# Patient Record
Sex: MALE | Race: WHITE | NOT HISPANIC OR LATINO | Employment: FULL TIME | URBAN - METROPOLITAN AREA
[De-identification: names, ages, dates, MRNs, and addresses within clinical notes are randomized per-mention and may not be internally consistent; named-entity substitution may affect disease eponyms.]

---

## 2020-06-22 ENCOUNTER — OFFICE VISIT (OUTPATIENT)
Dept: ORTHOPEDICS | Facility: CLINIC | Age: 35
End: 2020-06-22
Payer: COMMERCIAL

## 2020-06-22 VITALS — BODY MASS INDEX: 34.35 KG/M2 | WEIGHT: 226.69 LBS | HEIGHT: 68 IN | TEMPERATURE: 100 F

## 2020-06-22 DIAGNOSIS — S43.102A AC SEPARATION, LEFT, INITIAL ENCOUNTER: Primary | ICD-10-CM

## 2020-06-22 PROCEDURE — 3008F BODY MASS INDEX DOCD: CPT | Mod: CPTII,S$GLB,, | Performed by: ORTHOPAEDIC SURGERY

## 2020-06-22 PROCEDURE — 99202 PR OFFICE/OUTPT VISIT, NEW, LEVL II, 15-29 MIN: ICD-10-PCS | Mod: S$GLB,,, | Performed by: ORTHOPAEDIC SURGERY

## 2020-06-22 PROCEDURE — 99202 OFFICE O/P NEW SF 15 MIN: CPT | Mod: S$GLB,,, | Performed by: ORTHOPAEDIC SURGERY

## 2020-06-22 PROCEDURE — 3008F PR BODY MASS INDEX (BMI) DOCUMENTED: ICD-10-PCS | Mod: CPTII,S$GLB,, | Performed by: ORTHOPAEDIC SURGERY

## 2020-06-22 RX ORDER — LAMOTRIGINE 25 MG/1
TABLET, EXTENDED RELEASE ORAL
COMMUNITY
Start: 2020-03-15

## 2020-06-22 RX ORDER — ACETAMINOPHEN AND CODEINE PHOSPHATE 300; 30 MG/1; MG/1
TABLET ORAL
COMMUNITY
Start: 2020-06-12

## 2020-06-22 RX ORDER — FLUOXETINE HYDROCHLORIDE 20 MG/1
CAPSULE ORAL
COMMUNITY
Start: 2020-03-15

## 2020-06-22 NOTE — PROGRESS NOTES
Subjective:      Patient ID: Rangel Devries is a 35 y.o. male.    Chief Complaint: Pain of the Left Shoulder    HPI 35-year-old man who fell off the back of a 4 rodarte 5 days ago and injured his left shoulder.  He was seen in a local emergency room and placed in a sling.  X-rays were taken at that time    Review of Systems   Constitution: Negative for fever and weight loss.   Cardiovascular: Negative for chest pain and leg swelling.   Musculoskeletal: Positive for joint pain, joint swelling and stiffness. Negative for arthritis and muscle weakness.   Gastrointestinal: Negative for change in bowel habit.   Genitourinary: Negative for bladder incontinence and hematuria.   Neurological: Negative for focal weakness, numbness, paresthesias and sensory change.         Objective:      Examination shows tenderness of the AC joint with moderate swelling.  Passive range of motion is normal.  He has active range of motion of 90° of abduction 90° of flexion 40° of extension      Ortho/SPM Exam            Assessment:       Encounter Diagnosis   Name Primary?    AC separation, left, initial encounter Yes          Plan:       Rangel was seen today for pain.    Diagnoses and all orders for this visit:    AC separation, left, initial encounter      Outside x-rays are reviewed and they are unremarkable.  Impression is grade 1 AC separation.  Recommend ice and rest.  Return p.r.n.